# Patient Record
Sex: MALE | Employment: OTHER | URBAN - NONMETROPOLITAN AREA
[De-identification: names, ages, dates, MRNs, and addresses within clinical notes are randomized per-mention and may not be internally consistent; named-entity substitution may affect disease eponyms.]

---

## 2018-06-12 ENCOUNTER — TELEPHONE (OUTPATIENT)
Dept: FAMILY MEDICINE CLINIC | Age: 68
End: 2018-06-12

## 2018-06-12 DIAGNOSIS — R19.5 POSITIVE FIT (FECAL IMMUNOCHEMICAL TEST): Primary | ICD-10-CM

## 2018-06-19 ENCOUNTER — OFFICE VISIT (OUTPATIENT)
Dept: GASTROENTEROLOGY | Age: 68
End: 2018-06-19
Payer: MEDICARE

## 2018-06-19 VITALS
WEIGHT: 271 LBS | BODY MASS INDEX: 48.01 KG/M2 | DIASTOLIC BLOOD PRESSURE: 67 MMHG | SYSTOLIC BLOOD PRESSURE: 137 MMHG | HEART RATE: 90 BPM

## 2018-06-19 DIAGNOSIS — R19.5 HEME POSITIVE STOOL: Primary | ICD-10-CM

## 2018-06-19 PROCEDURE — 99203 OFFICE O/P NEW LOW 30 MIN: CPT | Performed by: INTERNAL MEDICINE

## 2018-06-19 PROCEDURE — 4040F PNEUMOC VAC/ADMIN/RCVD: CPT | Performed by: INTERNAL MEDICINE

## 2018-06-19 PROCEDURE — G8419 CALC BMI OUT NRM PARAM NOF/U: HCPCS | Performed by: INTERNAL MEDICINE

## 2018-06-19 PROCEDURE — 1123F ACP DISCUSS/DSCN MKR DOCD: CPT | Performed by: INTERNAL MEDICINE

## 2018-06-19 PROCEDURE — 3017F COLORECTAL CA SCREEN DOC REV: CPT | Performed by: INTERNAL MEDICINE

## 2018-06-19 PROCEDURE — 4004F PT TOBACCO SCREEN RCVD TLK: CPT | Performed by: INTERNAL MEDICINE

## 2018-06-19 PROCEDURE — G8427 DOCREV CUR MEDS BY ELIG CLIN: HCPCS | Performed by: INTERNAL MEDICINE

## 2021-01-01 ENCOUNTER — HOSPITAL ENCOUNTER (EMERGENCY)
Age: 71
End: 2021-02-28
Payer: COMMERCIAL

## 2021-01-01 VITALS — WEIGHT: 315 LBS | BODY MASS INDEX: 49.44 KG/M2 | HEIGHT: 67 IN | RESPIRATION RATE: 22 BRPM

## 2021-01-01 DIAGNOSIS — I46.9 CARDIOPULMONARY ARREST (HCC): Primary | ICD-10-CM

## 2021-01-01 LAB
CHP ED QC CHECK: YES
GLUCOSE BLD-MCNC: 310 MG/DL

## 2021-01-01 PROCEDURE — 96376 TX/PRO/DX INJ SAME DRUG ADON: CPT

## 2021-01-01 PROCEDURE — 96375 TX/PRO/DX INJ NEW DRUG ADDON: CPT

## 2021-01-01 PROCEDURE — 6360000002 HC RX W HCPCS: Performed by: EMERGENCY MEDICINE

## 2021-01-01 PROCEDURE — 2500000003 HC RX 250 WO HCPCS: Performed by: EMERGENCY MEDICINE

## 2021-01-01 PROCEDURE — 96374 THER/PROPH/DIAG INJ IV PUSH: CPT

## 2021-01-01 PROCEDURE — 2580000003 HC RX 258: Performed by: EMERGENCY MEDICINE

## 2021-01-01 PROCEDURE — 92950 HEART/LUNG RESUSCITATION CPR: CPT

## 2021-01-01 PROCEDURE — 99284 EMERGENCY DEPT VISIT MOD MDM: CPT

## 2021-01-01 RX ORDER — AMIODARONE HYDROCHLORIDE 50 MG/ML
INJECTION, SOLUTION INTRAVENOUS DAILY PRN
Status: COMPLETED | OUTPATIENT
Start: 2021-01-01 | End: 2021-01-01

## 2021-01-01 RX ORDER — SODIUM CHLORIDE 9 MG/ML
INJECTION, SOLUTION INTRAVENOUS CONTINUOUS PRN
Status: COMPLETED | OUTPATIENT
Start: 2021-01-01 | End: 2021-01-01

## 2021-01-01 RX ORDER — EPINEPHRINE 0.1 MG/ML
SYRINGE (ML) INJECTION DAILY PRN
Status: COMPLETED | OUTPATIENT
Start: 2021-01-01 | End: 2021-01-01

## 2021-01-01 RX ADMIN — SODIUM CHLORIDE 250 ML: 9 INJECTION, SOLUTION INTRAVENOUS at 06:38

## 2021-01-01 RX ADMIN — AMIODARONE HYDROCHLORIDE 150 MG: 50 INJECTION, SOLUTION INTRAVENOUS at 06:39

## 2021-01-01 RX ADMIN — SODIUM BICARBONATE 50 MEQ: 84 INJECTION, SOLUTION INTRAVENOUS at 06:42

## 2021-01-01 RX ADMIN — EPINEPHRINE 1 MG: 0.1 INJECTION, SOLUTION ENDOTRACHEAL; INTRACARDIAC; INTRAVENOUS at 06:38

## 2021-01-01 RX ADMIN — EPINEPHRINE 1 MG: 0.1 INJECTION, SOLUTION ENDOTRACHEAL; INTRACARDIAC; INTRAVENOUS at 06:41

## 2021-02-28 NOTE — ED NOTES
Dr. Soni Stevens and Florian Gandara NP spoke with Wife and family that arrived in the er. Advised pt is .       Cheryl Thomas RN  [phone]

## 2021-02-28 NOTE — ED TRIAGE NOTES
Per Ems they were called to the pts home for SOB at 0530. On arrival pt was found unresponsive with wife on scene. Pts wife states that the pt walked into the room and told her he was SOB. Pt shortly after collapsed to the floor and per wife was not breathing. Unknown is bystander CPR was performed. EMS states the pt present when they arrived at his side in cardiac arrest. Pt was noted on their monitor to have PEA. interventions were performed between scene and arrival to ER.      Per EMS  Pt is PEA  Curly Dibbles devise was placed  IO was inserted in right tibia  rhythm to V-Fib   x2 shocks were delivered, unknown times  Pt medications given   x3 epi 1/04361  1 amp Bicarb  300mg amiodarone    Total down time prior to arrival to ED 47 mins

## 2021-02-28 NOTE — ED NOTES
Patient time of death per Dr. Patricia Macias, noted bedside ultrasound used \"no cardiac movement\", no palpable pulse present at this time, no spontaneous breathing noted. pupils are fixed and dilated, skin cold and mottled. RN's present in room at this time BUBBA Bhatia. Yesika More NP also present at the bedside.       Estevan Frederick RN  02/28/21 0403

## 2021-02-28 NOTE — ED PROVIDER NOTES
[address] [facility] ED  EMERGENCY DEPARTMENT ENCOUNTER      Pt Name: Sylwia Alvarez  MRN: [id number]  [place] 1950  Date of evaluation: 2/28/2021  Provider: INDIA Reeder CNP    CHIEF COMPLAINT       Chief Complaint   Patient presents with    Cardiac Arrest         HISTORY OF PRESENT ILLNESS   (Location/Symptom, Timing/Onset,Context/Setting, Quality, Duration, Modifying Factors, Severity)  Note limiting factors. Sylwia Alvarez is a 72 y.o. male who presents to the emergency department with a chart reviewed pmhx of Afib, COPD, Edema, GERD, MI, and congenital heart disease for a chief complaint of witnessed arrest by wife. Wife reports that patient has been complaining of chest pain and shortness of breath x4+ days. She states that patient woke up in the middle of the night complaining of worsening SOB, and then collapsed in front of her at 5:15am when she called the squad. The EMS team shocked the patient 3 times, intubated the patient, gave 3 rounds of epinephrine, 1 round of amiodarone, and then arrived to the emergency department. ACLS protocol implemented upon arrival of the patient. Nursing Notes were reviewed. REVIEW OF SYSTEMS    (2-9 systems for level 4, 10 or more for level 5)     Review of Systems   Unable to perform ROS: Patient unresponsive       Except as noted above the remainder of the review of systems was reviewed and negative.        PAST MEDICAL HISTORY     Past Medical History:   Diagnosis Date    Atrial fibrillation ([facility] [address]     Congenital heart disease     COPD (chronic obstructive pulmonary disease) (HCC)     Edema     GERD (gastroesophageal reflux disease)     Headache     Hypertension     Myocardial infarct ([facility] [address]     Sinus complaint     Tobacco use 12/8/2016     Past Surgical History:   Procedure Laterality Date    CORONARY ANGIOPLASTY WITH STENT PLACEMENT      MANDIBLE SURGERY       Social History     Socioeconomic History    Marital status: Unknown Spouse name: Not on file    Number of children: Not on file    Years of education: Not on file    Highest education level: Not on file   Occupational History    Not on file   Social Needs    Financial resource strain: Not on file    Food insecurity     Worry: Not on file     Inability: Not on file    Transportation needs     Medical: Not on file     Non-medical: Not on file   Tobacco Use    Smoking status: Current Every Day Smoker     Packs/day: 1.00     Types: Cigarettes    Smokeless tobacco: Never Used   Substance and Sexual Activity    Alcohol use: No    Drug use: No    Sexual activity: Not on file   Lifestyle    Physical activity     Days per week: Not on file     Minutes per session: Not on file    Stress: Not on file   Relationships    Social connections     Talks on phone: Not on file     Gets together: Not on file     Attends Voodoo service: Not on file     Active member of club or organization: Not on file     Attends meetings of clubs or organizations: Not on file     Relationship status: Not on file    Intimate partner violence     Fear of current or ex partner: Not on file     Emotionally abused: Not on file     Physically abused: Not on file     Forced sexual activity: Not on file   Other Topics Concern    Not on file   Social History Narrative    Not on file       SCREENINGS    [place] Coma Scale  Eye Opening: None  Best Verbal Response: None  Best Motor Response: None  [place] Coma Scale Score: 3        PHYSICAL EXAM    (up to 7 for level 4, 8 or more for level 5)     ED Triage Vitals [02/28/21 0645]   BP Temp Temp src Pulse Resp SpO2 Height Weight   -- -- -- -- 22 -- -- --       Physical Exam  Vitals signs and nursing note reviewed. Constitutional:       General: He is in acute distress. Appearance: He is well-developed. He is toxic-appearing. He is not diaphoretic. HENT:      Head: Normocephalic and atraumatic.       Nose: Nose normal.   Neck: Musculoskeletal: Normal range of motion and neck supple. Cardiovascular:      Heart sounds: Normal heart sounds. Skin:     General: Skin is dry. Coloration: Skin is pale. Findings: No rash. Neurological:      Mental Status: He is alert. RESULTS     EKG: All EKG's are interpreted by the Emergency Department Physician who either signs or Co-signsthis chart in the absence of a cardiologist.        RADIOLOGY:   [place] such as CT, Ultrasound and MRI are read by the radiologist. Plain radiographic images are visualized and preliminarily interpreted by the emergency physician with the below findings:        Interpretation per the Radiologist below, if available at the time ofthis note:    No orders to display         ED BEDSIDE ULTRASOUND:   Performed by ED Physician - none    LABS:  Labs Reviewed   POCT GLUCOSE - Normal       All other labs were within normal range or not returned as of this dictation. EMERGENCY DEPARTMENT COURSE and DIFFERENTIAL DIAGNOSIS/MDM:   Vitals:    Vitals:    21 0636 21 0645   Resp:  22   Weight: (!) 370 lb (167.8 kg)    Height: 5' 7\" (1.702 m)             MDM   Patient is a 29-year-old male presenting to the emergency department unresponsive and cardiac arrest.  Patient was intubated on scene by the EMS team.  Verification of the placement of the tube completed using the glide scope. The patient was down for approximately 45 minutes prior to arrival to the emergency department. The patient was further resuscitated using ACLS protocol. Please review nurse's notes for ACLS protocol. After attempt of resuscitation Dr. Rhianna Kinney pronounced patient dead with time of death 46. Patient's family members were his fiancée and her friend. Dr. Rhianna Kinney told the family that the patient has been pronounced dead. They do not have any arrangements for  home at this time. He spoke to Dr. Clarence Licea, from the coronaries, who released the body to the [facility]. Patient's physician is Dr. Libertad Senior from the [facility]. After 3 attempts to reach this physician, the emergency department was unable to reach the physician to sign a certificate. CRITICAL CARE TIME       CONSULTS:  None    PROCEDURES:  Unless otherwise noted below, none     Procedures    FINAL IMPRESSION      1. Cardiopulmonary arrest [facility])          DISPOSITION/PLAN   DISPOSITION  2021 06:47:02 AM      PATIENT REFERRED TO:  No follow-up provider specified.     DISCHARGE MEDICATIONS:  Discharge Medication List as of 2021  8:47 AM             (Please notethat portions of this note were completed with a voice recognition program.  Efforts were made to edit the dictations but occasionally words are mis-transcribed.)    INDIA Haley CNP (electronically signed)  Attending Emergency Physician         INDIA Mckenna CNP  21 [phone]

## 2021-02-28 NOTE — ED NOTES
Unable to get information t contact his pcp. His children live in [place] and the girlfriend will contact them but  Does not have their contact information readily available at this time.    home is not picked out at this time      [facility]  21 [phone]